# Patient Record
Sex: MALE | Race: WHITE | NOT HISPANIC OR LATINO | Employment: FULL TIME | URBAN - METROPOLITAN AREA
[De-identification: names, ages, dates, MRNs, and addresses within clinical notes are randomized per-mention and may not be internally consistent; named-entity substitution may affect disease eponyms.]

---

## 2022-06-02 ENCOUNTER — OFFICE VISIT (OUTPATIENT)
Dept: PLASTIC SURGERY | Facility: CLINIC | Age: 62
End: 2022-06-02

## 2022-06-02 ENCOUNTER — PREP FOR SURGERY (OUTPATIENT)
Dept: OTHER | Facility: HOSPITAL | Age: 62
End: 2022-06-02

## 2022-06-02 ENCOUNTER — TELEPHONE (OUTPATIENT)
Dept: PLASTIC SURGERY | Facility: CLINIC | Age: 62
End: 2022-06-02

## 2022-06-02 VITALS
SYSTOLIC BLOOD PRESSURE: 125 MMHG | BODY MASS INDEX: 32.86 KG/M2 | HEIGHT: 72 IN | OXYGEN SATURATION: 95 % | TEMPERATURE: 98.7 F | HEART RATE: 62 BPM | DIASTOLIC BLOOD PRESSURE: 84 MMHG | WEIGHT: 242.6 LBS

## 2022-06-02 DIAGNOSIS — L71.1 RHINOPHYMA: Primary | ICD-10-CM

## 2022-06-02 DIAGNOSIS — L71.9 ROSACEA: ICD-10-CM

## 2022-06-02 PROBLEM — C44.310 BASAL CELL CARCINOMA (BCC) OF SKIN OF FACE: Status: ACTIVE | Noted: 2022-06-02

## 2022-06-02 PROCEDURE — 99203 OFFICE O/P NEW LOW 30 MIN: CPT | Performed by: NURSE PRACTITIONER

## 2022-06-02 RX ORDER — METFORMIN HYDROCHLORIDE 500 MG/1
500 TABLET, EXTENDED RELEASE ORAL 2 TIMES DAILY
COMMUNITY
Start: 2022-03-07

## 2022-06-02 RX ORDER — LOSARTAN POTASSIUM 50 MG/1
50 TABLET ORAL DAILY
COMMUNITY
Start: 2022-03-07

## 2022-06-02 RX ORDER — AZELAIC ACID 0.15 G/G
GEL TOPICAL DAILY
COMMUNITY
Start: 2022-03-09

## 2022-06-02 RX ORDER — AMLODIPINE BESYLATE 10 MG/1
10 TABLET ORAL DAILY
COMMUNITY
Start: 2022-05-03

## 2022-06-02 RX ORDER — GLIPIZIDE 10 MG/1
10 TABLET ORAL
COMMUNITY

## 2022-06-02 RX ORDER — DOXYCYCLINE HYCLATE 100 MG/1
100 CAPSULE ORAL DAILY
COMMUNITY
Start: 2022-05-03 | End: 2023-03-10

## 2022-06-02 RX ORDER — CEFAZOLIN SODIUM 2 G/100ML
2 INJECTION, SOLUTION INTRAVENOUS ONCE
Status: CANCELLED | OUTPATIENT
Start: 2022-06-02 | End: 2022-06-02

## 2022-06-02 NOTE — PROGRESS NOTES
"Consult (RECON-Consult-Dr Perla-Rhinophyma)            History of Present Illness  Kelvin Dow is a 61 y.o. 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 He states he has tried several medications without success. He is currently using Azelez Gel and multiple medications. He will have Dr Perla's office fax notes with all medications he has tried and failed. The nose has enlarged so much it is disfigured and tender. The size of the nares are starting to occlude air flow through the nostril at times.    Subjective       Aspirin  Allergies Reconciled.    Review of Systems    All review of system has been reviewed and it  is negative except the ones note above.     Objective     /84 (BP Location: Left arm)   Pulse 62   Temp 98.7 °F (37.1 °C) (Temporal)   Ht 182.9 cm (72\")   Wt 110 kg (242 lb 9.6 oz)   SpO2 95%   BMI 32.90 kg/m²     Body mass index is 32.9 kg/m².    Physical Exam   Cardiovascular: Normal rate.     Pulmonary/Chest  Effort normal.      Face: nose with multiple raised firm areas with bulbous appearance over tip and extending laterally over both ala    Result Review :       Procedures Outpatient Surgery          Assessment and Plan      Diagnoses and all orders for this visit:    1. Rhinophyma (Primary)    2. Rosacea        Plan:  He will have Dr Perla's office fax office notes with all medications tried & failed. Discussed Rhinoplasty to correct nasal deformity. Will plan to have in OR. He does not take ASA, he does not smoke and is a good surgical candidate. Surgery will not improve his appearance but also prevent the ala from collapsing and obstructing air way.  Discussed risk and benefits  including bleeding, infection, reoccurrence, and scarring. Patient wishes to proceed. Will submit to insurance and schedule.    Time: 2 hours  Consent: " Rhinophyma Excision  CPT: 04706        Follow Up     No follow-ups on file.    Patient was given instructions and counseling regarding his condition. Please see specific information pulled into the AVS if appropriate.     FREDY Hernandez  06/02/2022

## 2022-06-02 NOTE — TELEPHONE ENCOUNTER
Patient states he is covid vaccinated 1st and 2nd doses of Moderna. Patient says his vaccination card is at home. I explained to patient he must bring the card the morning of surgery or they will cancel surgery.    We require patients to be covid tested prior to surgery, so if he can't find his card he will need to have this done 06/06/22.

## 2022-06-09 NOTE — PROGRESS NOTES
"Chief Complaint  Post-op Follow-up (1wk post op )    Subjective          History of Present Illness  Kelvin Dow is a 61 y.o. male who presents to [facility] for Postoperative Follow-Up of Rhinophyma Excision 6/10/22.   He is 1wk post op today and does not have any concerns.        Allergies: Aspirin  Allergies Reconciled.    Review of Systems   All review of system has been reviewed and it  is negative except the ones note above    Objective     /76 (BP Location: Left arm)   Pulse 55   Temp 98.1 °F (36.7 °C) (Temporal)   Ht 182.9 cm (72\")   Wt 107 kg (236 lb 9.6 oz)   SpO2 97%   BMI 32.09 kg/m²     Body mass index is 32.09 kg/m².    Physical Exam   Cardiovascular: Normal rate.     Pulmonary/Chest  Effort normal.        General Inspection: Incision healing well with dissolvable sutures and scab, minimal swelling, no erythema, no drainage    Result Review :                Assessment and Plan      Diagnoses and all orders for this visit:    1. Postoperative follow-up (Primary)        Plan:  • Recommended Laser in the future, stay out of sun x 1yr,  Apply Aquaphor to help with scarring, RTO 1M for follow up         Follow Up     Return for 1m for follow up .    Patient was given instructions and counseling regarding his condition. Please see specific information pulled into the AVS if appropriate.     Zahraa Goss, APRN  06/16/2022  "

## 2022-06-10 ENCOUNTER — HOSPITAL ENCOUNTER (OUTPATIENT)
Facility: HOSPITAL | Age: 62
Setting detail: HOSPITAL OUTPATIENT SURGERY
Discharge: HOME | End: 2022-06-10
Attending: SURGERY | Admitting: SURGERY
Payer: COMMERCIAL

## 2022-06-10 ENCOUNTER — ANESTHESIA EVENT (OUTPATIENT)
Dept: PERIOP | Facility: HOSPITAL | Age: 62
End: 2022-06-10
Payer: COMMERCIAL

## 2022-06-10 ENCOUNTER — ANESTHESIA (OUTPATIENT)
Dept: PERIOP | Facility: HOSPITAL | Age: 62
End: 2022-06-10
Payer: COMMERCIAL

## 2022-06-10 VITALS
HEIGHT: 72 IN | OXYGEN SATURATION: 94 % | RESPIRATION RATE: 18 BRPM | BODY MASS INDEX: 32.13 KG/M2 | TEMPERATURE: 97.8 F | HEART RATE: 58 BPM | SYSTOLIC BLOOD PRESSURE: 142 MMHG | DIASTOLIC BLOOD PRESSURE: 90 MMHG | WEIGHT: 237.22 LBS

## 2022-06-10 DIAGNOSIS — L71.1 RHINOPHYMA: ICD-10-CM

## 2022-06-10 LAB
GLUCOSE BLDC GLUCOMTR-MCNC: 135 MG/DL (ref 70–99)
GLUCOSE BLDC GLUCOMTR-MCNC: 146 MG/DL (ref 70–99)

## 2022-06-10 PROCEDURE — 25010000002 DEXAMETHASONE PER 1 MG

## 2022-06-10 PROCEDURE — 25010000002 MIDAZOLAM PER 1 MG: Performed by: ANESTHESIOLOGY

## 2022-06-10 PROCEDURE — 82962 GLUCOSE BLOOD TEST: CPT

## 2022-06-10 PROCEDURE — 30120 REVISION OF NOSE: CPT | Performed by: SURGERY

## 2022-06-10 PROCEDURE — 14060 TIS TRNFR E/N/E/L 10 SQ CM/<: CPT | Performed by: SURGERY

## 2022-06-10 PROCEDURE — 25010000002 CEFAZOLIN IN DEXTROSE 2-4 GM/100ML-% SOLUTION: Performed by: NURSE PRACTITIONER

## 2022-06-10 PROCEDURE — 25010000002 FENTANYL CITRATE (PF) 50 MCG/ML SOLUTION

## 2022-06-10 PROCEDURE — 25010000002 PROPOFOL 10 MG/ML EMULSION

## 2022-06-10 PROCEDURE — 25010000002 ONDANSETRON PER 1 MG

## 2022-06-10 PROCEDURE — 88305 TISSUE EXAM BY PATHOLOGIST: CPT | Performed by: SURGERY

## 2022-06-10 RX ORDER — ROCURONIUM BROMIDE 10 MG/ML
INJECTION, SOLUTION INTRAVENOUS AS NEEDED
Status: DISCONTINUED | OUTPATIENT
Start: 2022-06-10 | End: 2022-06-10 | Stop reason: SURG

## 2022-06-10 RX ORDER — CEFAZOLIN SODIUM 2 G/100ML
2 INJECTION, SOLUTION INTRAVENOUS ONCE
Status: COMPLETED | OUTPATIENT
Start: 2022-06-10 | End: 2022-06-10

## 2022-06-10 RX ORDER — OXYCODONE HYDROCHLORIDE 5 MG/1
5 TABLET ORAL
Status: DISCONTINUED | OUTPATIENT
Start: 2022-06-10 | End: 2022-06-10 | Stop reason: HOSPADM

## 2022-06-10 RX ORDER — FENTANYL CITRATE 50 UG/ML
INJECTION, SOLUTION INTRAMUSCULAR; INTRAVENOUS AS NEEDED
Status: DISCONTINUED | OUTPATIENT
Start: 2022-06-10 | End: 2022-06-10 | Stop reason: SURG

## 2022-06-10 RX ORDER — PHENYLEPHRINE HCL IN 0.9% NACL 1 MG/10 ML
SYRINGE (ML) INTRAVENOUS AS NEEDED
Status: DISCONTINUED | OUTPATIENT
Start: 2022-06-10 | End: 2022-06-10 | Stop reason: SURG

## 2022-06-10 RX ORDER — PROPOFOL 10 MG/ML
VIAL (ML) INTRAVENOUS AS NEEDED
Status: DISCONTINUED | OUTPATIENT
Start: 2022-06-10 | End: 2022-06-10 | Stop reason: SURG

## 2022-06-10 RX ORDER — SODIUM CHLORIDE, SODIUM LACTATE, POTASSIUM CHLORIDE, CALCIUM CHLORIDE 600; 310; 30; 20 MG/100ML; MG/100ML; MG/100ML; MG/100ML
9 INJECTION, SOLUTION INTRAVENOUS CONTINUOUS PRN
Status: DISCONTINUED | OUTPATIENT
Start: 2022-06-10 | End: 2022-06-10 | Stop reason: HOSPADM

## 2022-06-10 RX ORDER — ONDANSETRON 2 MG/ML
INJECTION INTRAMUSCULAR; INTRAVENOUS AS NEEDED
Status: DISCONTINUED | OUTPATIENT
Start: 2022-06-10 | End: 2022-06-10 | Stop reason: SURG

## 2022-06-10 RX ORDER — PROMETHAZINE HYDROCHLORIDE 25 MG/1
25 SUPPOSITORY RECTAL ONCE AS NEEDED
Status: DISCONTINUED | OUTPATIENT
Start: 2022-06-10 | End: 2022-06-10 | Stop reason: HOSPADM

## 2022-06-10 RX ORDER — LIDOCAINE HYDROCHLORIDE 20 MG/ML
INJECTION, SOLUTION EPIDURAL; INFILTRATION; INTRACAUDAL; PERINEURAL AS NEEDED
Status: DISCONTINUED | OUTPATIENT
Start: 2022-06-10 | End: 2022-06-10 | Stop reason: SURG

## 2022-06-10 RX ORDER — PROMETHAZINE HYDROCHLORIDE 12.5 MG/1
25 TABLET ORAL ONCE AS NEEDED
Status: DISCONTINUED | OUTPATIENT
Start: 2022-06-10 | End: 2022-06-10 | Stop reason: HOSPADM

## 2022-06-10 RX ORDER — MEPERIDINE HYDROCHLORIDE 25 MG/ML
12.5 INJECTION INTRAMUSCULAR; INTRAVENOUS; SUBCUTANEOUS
Status: DISCONTINUED | OUTPATIENT
Start: 2022-06-10 | End: 2022-06-10 | Stop reason: HOSPADM

## 2022-06-10 RX ORDER — MIDAZOLAM HYDROCHLORIDE 1 MG/ML
2 INJECTION INTRAMUSCULAR; INTRAVENOUS ONCE
Status: COMPLETED | OUTPATIENT
Start: 2022-06-10 | End: 2022-06-10

## 2022-06-10 RX ORDER — ONDANSETRON 2 MG/ML
4 INJECTION INTRAMUSCULAR; INTRAVENOUS ONCE AS NEEDED
Status: DISCONTINUED | OUTPATIENT
Start: 2022-06-10 | End: 2022-06-10 | Stop reason: HOSPADM

## 2022-06-10 RX ORDER — GLYCOPYRROLATE 0.2 MG/ML
INJECTION INTRAMUSCULAR; INTRAVENOUS AS NEEDED
Status: DISCONTINUED | OUTPATIENT
Start: 2022-06-10 | End: 2022-06-10 | Stop reason: SURG

## 2022-06-10 RX ORDER — DEXAMETHASONE SODIUM PHOSPHATE 4 MG/ML
INJECTION, SOLUTION INTRA-ARTICULAR; INTRALESIONAL; INTRAMUSCULAR; INTRAVENOUS; SOFT TISSUE AS NEEDED
Status: DISCONTINUED | OUTPATIENT
Start: 2022-06-10 | End: 2022-06-10 | Stop reason: SURG

## 2022-06-10 RX ORDER — ACETAMINOPHEN 500 MG
1000 TABLET ORAL ONCE
Status: COMPLETED | OUTPATIENT
Start: 2022-06-10 | End: 2022-06-10

## 2022-06-10 RX ADMIN — MIDAZOLAM HYDROCHLORIDE 2 MG: 1 INJECTION, SOLUTION INTRAMUSCULAR; INTRAVENOUS at 11:13

## 2022-06-10 RX ADMIN — ONDANSETRON 4 MG: 2 INJECTION INTRAMUSCULAR; INTRAVENOUS at 11:40

## 2022-06-10 RX ADMIN — SUGAMMADEX 200 MG: 100 INJECTION, SOLUTION INTRAVENOUS at 12:14

## 2022-06-10 RX ADMIN — PROPOFOL 150 MG: 10 INJECTION, EMULSION INTRAVENOUS at 11:31

## 2022-06-10 RX ADMIN — DEXAMETHASONE SODIUM PHOSPHATE 4 MG: 4 INJECTION, SOLUTION INTRA-ARTICULAR; INTRALESIONAL; INTRAMUSCULAR; INTRAVENOUS; SOFT TISSUE at 11:40

## 2022-06-10 RX ADMIN — ACETAMINOPHEN 1000 MG: 500 TABLET ORAL at 10:05

## 2022-06-10 RX ADMIN — FENTANYL CITRATE 50 MCG: 50 INJECTION, SOLUTION INTRAMUSCULAR; INTRAVENOUS at 11:31

## 2022-06-10 RX ADMIN — SODIUM CHLORIDE, POTASSIUM CHLORIDE, SODIUM LACTATE AND CALCIUM CHLORIDE 9 ML/HR: 600; 310; 30; 20 INJECTION, SOLUTION INTRAVENOUS at 10:05

## 2022-06-10 RX ADMIN — GLYCOPYRROLATE 0.2 MG: 0.2 INJECTION INTRAMUSCULAR; INTRAVENOUS at 12:00

## 2022-06-10 RX ADMIN — LIDOCAINE HYDROCHLORIDE 50 MG: 20 INJECTION, SOLUTION EPIDURAL; INFILTRATION; INTRACAUDAL; PERINEURAL at 11:31

## 2022-06-10 RX ADMIN — ROCURONIUM BROMIDE 50 MG: 10 INJECTION INTRAVENOUS at 11:31

## 2022-06-10 RX ADMIN — OXYCODONE HYDROCHLORIDE 5 MG: 5 TABLET ORAL at 12:31

## 2022-06-10 RX ADMIN — CEFAZOLIN SODIUM 2 G: 2 INJECTION, SOLUTION INTRAVENOUS at 11:32

## 2022-06-10 RX ADMIN — Medication 100 MCG: at 12:00

## 2022-06-10 NOTE — ANESTHESIA PREPROCEDURE EVALUATION
Anesthesia Evaluation     Patient summary reviewed and Nursing notes reviewed   no history of anesthetic complications:  NPO Solid Status: > 8 hours  NPO Liquid Status: > 2 hours           Airway   Mallampati: III  TM distance: >3 FB  Neck ROM: full  No difficulty expected  Comment: LARGE TONGUE   Dental      Pulmonary - negative pulmonary ROS and normal exam    breath sounds clear to auscultation  Cardiovascular - normal exam  Exercise tolerance: good (4-7 METS)    Rhythm: regular  Rate: normal    (+) hypertension,       Neuro/Psych- negative ROS  GI/Hepatic/Renal/Endo    (+)   diabetes mellitus type 2,     Musculoskeletal (-) negative ROS    Abdominal    Substance History - negative use     OB/GYN negative ob/gyn ROS         Other - negative ROS                     Anesthesia Plan    ASA 3     general     (Patient understands anesthesia not responsible for dental damage.)  intravenous induction     Anesthetic plan, risks, benefits, and alternatives have been provided, discussed and informed consent has been obtained with: patient.    Plan discussed with CRNA.        CODE STATUS:

## 2022-06-10 NOTE — ANESTHESIA POSTPROCEDURE EVALUATION
Patient: Kelvin Dow    Procedure Summary     Date: 06/10/22 Room / Location: [facility] OR  / [facility]    Anesthesia Start: 1122 Anesthesia Stop: 1225    Procedure: Bilateral Nasal Rhinophyma Excision (Bilateral ) Diagnosis:       Rhinophyma      (Rhinophyma [L71.1])    Surgeons: Cierra Pinzon MD Provider: Laurent Montgomery MD    Anesthesia Type: general ASA Status: 3          Anesthesia Type: general    Vitals  Vitals Value Taken Time   /78 06/10/22 1240   Temp 36 °C (96.8 °F) 06/10/22 1224   Pulse 63 06/10/22 1250   Resp 14 06/10/22 1240   SpO2 92 % 06/10/22 1250   Vitals shown include unvalidated device data.        Post Anesthesia Care and Evaluation    Patient location during evaluation: bedside  Patient participation: complete - patient participated  Level of consciousness: awake and alert  Pain management: adequate    Airway patency: patent  Anesthetic complications: No anesthetic complications  PONV Status: none  Cardiovascular status: acceptable  Respiratory status: acceptable  Hydration status: acceptable    Comments: An Anesthesiologist personally participated in the most demanding procedures (including induction and emergence if applicable) in the anesthesia plan, monitored the course of anesthesia administration at frequent intervals and remained physically present and available for immediate diagnosis and treatment of emergencies.

## 2022-06-14 LAB
CYTO UR: NORMAL
LAB AP CASE REPORT: NORMAL
LAB AP CLINICAL INFORMATION: NORMAL
PATH REPORT.FINAL DX SPEC: NORMAL
PATH REPORT.GROSS SPEC: NORMAL

## 2022-06-16 ENCOUNTER — OFFICE VISIT (OUTPATIENT)
Dept: PLASTIC SURGERY | Facility: CLINIC | Age: 62
End: 2022-06-16

## 2022-06-16 VITALS
OXYGEN SATURATION: 97 % | DIASTOLIC BLOOD PRESSURE: 76 MMHG | TEMPERATURE: 98.1 F | SYSTOLIC BLOOD PRESSURE: 131 MMHG | HEART RATE: 55 BPM | BODY MASS INDEX: 32.05 KG/M2 | HEIGHT: 72 IN | WEIGHT: 236.6 LBS

## 2022-06-16 DIAGNOSIS — Z09 POSTOPERATIVE FOLLOW-UP: Primary | ICD-10-CM

## 2022-06-16 PROCEDURE — 99024 POSTOP FOLLOW-UP VISIT: CPT | Performed by: NURSE PRACTITIONER

## 2022-06-16 RX ORDER — CANAGLIFLOZIN 100 MG/1
TABLET, FILM COATED ORAL
COMMUNITY
Start: 2022-06-06

## 2022-07-08 NOTE — PROGRESS NOTES
"Chief Complaint  Post-op Follow-up (Bilateral Nasal Rhinophyma Excision)    Subjective          History of Present Illness  Kelvin Dow is a 61 y.o. male who presents to [facility] for Postoperative Follow-Up of Rhinophyma Excision 6/10/22.     He is 1m post op.  Doing well, no concerns.      Allergies: Aspirin  Allergies Reconciled.    Review of Systems   All review of system has been reviewed and it  is negative except the ones note above    Objective     /86 (BP Location: Left arm, Patient Position: Sitting)   Pulse 63   Temp 98.7 °F (37.1 °C) (Temporal)   Ht 182.9 cm (72.01\")   Wt 107 kg (236 lb 14.4 oz)   SpO2 97%   BMI 32.12 kg/m²     Body mass index is 32.12 kg/m².    Physical Exam   Cardiovascular: Normal rate.     Pulmonary/Chest  Effort normal.        Nose: Incision healing well,  no erythema, no drainage, no open areas     Result Review :                Assessment and Plan      Diagnoses and all orders for this visit:    1. Rhinophyma (Primary)        Plan:  Can resume Azeliac gel, avoid direct sun, RTC 3 months and will discuss further surgical procedures vs laser  Follow Up     Return in about 3 months (around 10/14/2022) for f/u rhinophyma excision with Dr. Pinzon.    Patient was given instructions and counseling regarding his condition. Please see specific information pulled into the AVS if appropriate.     Zahraa Goss, APRN  07/14/2022  "

## 2022-07-14 ENCOUNTER — OFFICE VISIT (OUTPATIENT)
Dept: PLASTIC SURGERY | Facility: CLINIC | Age: 62
End: 2022-07-14

## 2022-07-14 VITALS
BODY MASS INDEX: 32.09 KG/M2 | HEART RATE: 63 BPM | WEIGHT: 236.9 LBS | HEIGHT: 72 IN | OXYGEN SATURATION: 97 % | SYSTOLIC BLOOD PRESSURE: 145 MMHG | TEMPERATURE: 98.7 F | DIASTOLIC BLOOD PRESSURE: 86 MMHG

## 2022-07-14 DIAGNOSIS — L71.1 RHINOPHYMA: Primary | ICD-10-CM

## 2022-07-14 PROCEDURE — 99024 POSTOP FOLLOW-UP VISIT: CPT | Performed by: NURSE PRACTITIONER

## 2022-10-11 NOTE — PROGRESS NOTES
"Chief Complaint  Post-op Follow-up (4 months post op )    Subjective          History of Present Illness  Kelvin Dow is a 61 y.o. male who presents to [facility] for Postoperative Follow-Up of Rhinophyma Excision 6/10/22.     He is here for 4m post op. Has 2 areas that her sticking out and would like to have that removed, other wise happy with result, size and contour improved.      Allergies: Aspirin  Allergies Reconciled.    Review of Systems   All review of system has been reviewed and it  is negative except the ones note above    Objective     /88   Pulse 68   Temp 96.8 °F (36 °C)   Ht 182.9 cm (72.01\")   Wt 107 kg (236 lb)   SpO2 94%   BMI 32.00 kg/m²     Body mass index is 32 kg/m².    Physical Exam   Cardiovascular: Normal rate.     Pulmonary/Chest  Effort normal.        Nose  There are two 1 cm area on each side of the nares with flaps of skin hanging down, other areas healed with no open areas, rosacea persistent   Result Review :           PROCEDURE:  RECON- In office excision of rhinophyma and resurfacing laser 1.5hr.     Assessment and Plan      Diagnoses and all orders for this visit:    1. Rhinophyma (Primary)        Plan:   Discussed risk and benefits of excision of lesion including bleeding, infection, and scarring. Patient wishes to proceed. Will submit to insurance and schedule for in-office excision. We can perform laser the same day for rosacea to prevent further formation of rhynophyma. RTC for procedure.     CPT 78626,  72401         Scribed by Vivienne Mcclain MA, acting as a scribe for FREDY Hernandez, 10/20/22 09:42 EDT.  FREDY Hernandez's signature on the note affirms that the note adequately documents the care provided.    Patient was given instructions and counseling regarding his condition. Please see specific information pulled into the AVS if appropriate.     FREDY Hernandez  10/20/2022  "

## 2022-10-20 ENCOUNTER — OFFICE VISIT (OUTPATIENT)
Dept: PLASTIC SURGERY | Facility: CLINIC | Age: 62
End: 2022-10-20

## 2022-10-20 VITALS
WEIGHT: 236 LBS | DIASTOLIC BLOOD PRESSURE: 88 MMHG | SYSTOLIC BLOOD PRESSURE: 151 MMHG | BODY MASS INDEX: 31.97 KG/M2 | HEART RATE: 68 BPM | OXYGEN SATURATION: 94 % | TEMPERATURE: 96.8 F | HEIGHT: 72 IN

## 2022-10-20 DIAGNOSIS — L71.1 RHINOPHYMA: Primary | ICD-10-CM

## 2022-10-20 PROCEDURE — 99212 OFFICE O/P EST SF 10 MIN: CPT | Performed by: NURSE PRACTITIONER

## 2023-01-16 NOTE — PROGRESS NOTES
"Chief Complaint  Procedure (IOP Excision of rhinophyma and MaxG laser treatment)    Subjective              History of Present Illness  Kelvin Dow is a 62 y.o. male who presents to [facility] as a in office procedure  for excision of rhinophyma and Max G laser treatment    Allergies: Aspirin  Allergies Reconciled.    Review of Systems   All review of system has been reviewed and it  is negative except the ones note above.     Objective     /81 (BP Location: Left arm, Patient Position: Sitting)   Pulse 67   Temp 98 °F (36.7 °C) (Temporal)   Ht 182.9 cm (72.01\")   Wt 103 kg (227 lb 9.6 oz)   SpO2 95%   BMI 30.86 kg/m²     Body mass index is 30.86 kg/m².    Physical Exam    Cardiovascular: Normal rate    Pulmonary/Chest: Effort normal    Face: Rhinophyma on right side of nose, vascular lesions bilaterally.     Result Review :             Excision Procedure: Consent obtained. Local injected to site, Lidocaine 1% with epi.  Site prepped with ChloraPrep  in sterile fashion. Site draped in sterile fashion. I dissected down through skin and subcutaneous tissue completely around  Rhinophema  , after excision of  was sent from field for pathology. Established hemostasis with direct pressure. Site was thoroughly irrigated.  Bacitracin applied. The patient tolerated the procedure well with no immediate complications. Used handheld Bovie for cauterization.     Patient is here today for IPL/Laser treatment.  Treatment questionnaire completed and patient approved for procedure. All indications, benefits, alternatives, expected outcomes, and complications of laser treatment to the nose was discussed with the patient including:  · (Common side effects) unsatisfactory results, bleeding, bruising, swelling, scarring, pigment changes, hair loss or decreased growth, herpetic breakouts  and  · (Rare but serious complications) blood clots, blisters, burns, skin loss, " hematomas, allergic reactions.     Patient verbalizes understanding that sun exposure, tanning lamps and self-tanning creams are to be avoided before and after treatment or complications could occur.     Informed consent was obtained. Patient understands, accept risks, consents and wishes to proceed. This skin typing was then confirmed using the Skintel Reading device by obtaining five sample readings after application of Lux Lotion, showing the patient Melanin Index of 18. Visible skin in the treatment area removed using manual razor. Spot testing performed per protocol, (Crespo 1-3 wait 5-15 mins, Crespo 4-6 spot test performed) patient tolerated well.     In the exam room,  prepped with alcohol avoiding the hair and was allowed to sit at least 3 minutes. Laser-safe eye protection with appropriate wavelength and optical density was worn by all healthcare workers and the patient.  Procedural time out completed. Lux Lotion applied to treatment area as indicated. Laser performed. Pre-cooling to the treatment area performed, as indicated during procedure. Appropriate response noted throughout treatment. Followed by a post cooling phase.  The patient tolerated the procedure well with no immediate complications. Post-procedure instructions were given.     Procedural settings:  Area: Nose   Max G   MS - 20  J/cm2 - 38  Total Pulses - 24      Procedural settings:  Area: Nose   Max G  MS - 20  J/cm2 - 60  Total Pulses - 21      Procedural settings:  Area: Nose  2940  MS - 9/0/0  Total Pulses - 255    Treated size area: 5x 5 cm       Scribed by Carla De Souza, acting as a scribe for Cierra Pinzon MD, 01/26/23 10:09 EST.  Cierra Pinzon MD's signature on the note affirms that the note adequately documents the care provided.              Patient tolerated procedure well. No adverse effects noted. Post treatment instructions provider to patient. Patient also instructed to contact office with any questions  or concerns.           Assessment and Plan      Diagnoses and all orders for this visit:    1. Rhinophyma (Primary)        Plan:  RTO in 4-6 weeks for next Laser Treatment to Nose Max G & 2940 40min total.         Follow Up     No follow-ups on file.    Patient was given instructions and counseling regarding his condition. Please see specific information pulled into the AVS if appropriate.     Cierra Pinzon MD  01/26/2023

## 2023-01-26 ENCOUNTER — PROCEDURE VISIT (OUTPATIENT)
Dept: PLASTIC SURGERY | Facility: CLINIC | Age: 63
End: 2023-01-26
Payer: COMMERCIAL

## 2023-01-26 VITALS
DIASTOLIC BLOOD PRESSURE: 81 MMHG | WEIGHT: 227.6 LBS | SYSTOLIC BLOOD PRESSURE: 127 MMHG | BODY MASS INDEX: 30.83 KG/M2 | HEART RATE: 67 BPM | TEMPERATURE: 98 F | OXYGEN SATURATION: 95 % | HEIGHT: 72 IN

## 2023-01-26 DIAGNOSIS — L71.1 RHINOPHYMA: Primary | ICD-10-CM

## 2023-01-26 PROCEDURE — 30120 REVISION OF NOSE: CPT | Performed by: SURGERY

## 2023-01-26 PROCEDURE — 0479T FXJL ABL LSR 1ST 100 SQ CM: CPT | Performed by: SURGERY

## 2023-01-26 RX ORDER — INSULIN GLARGINE 300 U/ML
INJECTION, SOLUTION SUBCUTANEOUS
COMMUNITY
Start: 2022-12-29

## 2023-03-10 ENCOUNTER — PROCEDURE VISIT (OUTPATIENT)
Dept: PLASTIC SURGERY | Facility: CLINIC | Age: 63
End: 2023-03-10
Payer: COMMERCIAL

## 2023-03-10 VITALS
BODY MASS INDEX: 30.64 KG/M2 | DIASTOLIC BLOOD PRESSURE: 81 MMHG | WEIGHT: 226.2 LBS | HEART RATE: 68 BPM | OXYGEN SATURATION: 96 % | SYSTOLIC BLOOD PRESSURE: 127 MMHG | TEMPERATURE: 98.7 F | HEIGHT: 72 IN

## 2023-03-10 DIAGNOSIS — L71.1 RHINOPHYMA: Primary | ICD-10-CM

## 2023-03-10 PROCEDURE — 0479T FXJL ABL LSR 1ST 100 SQ CM: CPT | Performed by: NURSE PRACTITIONER

## 2023-03-10 RX ORDER — DOXYCYCLINE HYCLATE 50 MG/1
CAPSULE ORAL
COMMUNITY
Start: 2023-03-07

## 2023-04-11 NOTE — PROGRESS NOTES
"Chief Complaint  Laser Treatment (3 for me)    Subjective          History of Present Illness  Kelvin Dow is a 62 y.o. male who presents to [facility] as a cosmetic in office procedure for Max G/2940 laser treatment on nose.    Allergies: Aspirin  Allergies Reconciled.    Review of Systems   All review of system has been reviewed and it  is negative except the ones note above.     Objective     /82 (BP Location: Left arm, Patient Position: Sitting)   Pulse 60   Temp 98.4 °F (36.9 °C) (Temporal)   Ht 182.9 cm (72.01\")   Wt 102 kg (224 lb 12.8 oz)   SpO2 93%   BMI 30.48 kg/m²     Body mass index is 30.48 kg/m².    Physical Exam    Cardiovascular: Normal rate    Pulmonary/Chest: Effort normal    Face: Rhinophyma of nose, vascular lesions bilaterally.     Result Review :         Patient is here today for IPL/Laser treatment.  Treatment questionnaire completed and patient approved for procedure. All indications, benefits, alternatives, expected outcomes, and complications of laser treatment to the nose was discussed with the patient including:  · (Common side effects) unsatisfactory results, bleeding, bruising, swelling, scarring, pigment changes, hair loss or decreased growth, herpetic breakouts  and  · (Rare but serious complications) blood clots, blisters, burns, skin loss, hematomas, allergic reactions.     Patient verbalizes understanding that sun exposure, tanning lamps and self-tanning creams are to be avoided before and after treatment or complications could occur.     Informed consent was obtained. Patient understands, accept risks, consents and wishes to proceed. This skin typing was then confirmed using the Skintel Reading device by obtaining five sample readings after application of Lux Lotion, showing the patient Melanin Index of 18. Visible skin in the treatment area removed using manual razor. Spot testing performed per protocol, (Crespo " 1-3 wait 5-15 mins, Crespo 4-6 spot test performed) patient tolerated well.     In the exam room,  prepped with alcohol avoiding the hair and was allowed to sit at least 3 minutes. Laser-safe eye protection with appropriate wavelength and optical density was worn by all healthcare workers and the patient.  Procedural time out completed. Lux Lotion applied to treatment area as indicated. Laser performed. Pre-cooling to the treatment area performed, as indicated during procedure. Appropriate response noted throughout treatment. Followed by a post cooling phase.  The patient tolerated the procedure well with no immediate complications. Post-procedure instructions were given.     Procedural settings:  Area: Nose  Max G  MS - 10/30  Total Pulses - 29 pulses    Treated size area: 5x 5 cm     Area: Nose  1540  MS-15/50  Total pulses: 32    Assessment and Plan      Diagnoses and all orders for this visit:    1. Rhinophyma (Primary)        Plan:  Patient tolerated procedure well. No adverse effects noted. Post treatment instructions provider to patient. Patient also instructed to contact office with any questions or concerns.  May apply hydrating cream to nose. We done 1540 today with no charge due to 2940 not working. We will call patient once we get the technical difficulties settled.    Scribed by Vivienne Mcclain MA, acting as a scribe for FREDY Hernandez, 04/20/23 10:06 EDT.  FREDY Hernandez's signature on the note affirms that the note adequately documents the care provided.      This is a cosmetic visit. No charge due to laser stopped working and we will reschedule.    Patient was given instructions and counseling regarding his condition. Please see specific information pulled into the AVS if appropriate.     Vivienne Mcclain MA  04/20/2023

## 2023-04-20 ENCOUNTER — PROCEDURE VISIT (OUTPATIENT)
Dept: PLASTIC SURGERY | Facility: CLINIC | Age: 63
End: 2023-04-20

## 2023-04-20 VITALS
DIASTOLIC BLOOD PRESSURE: 82 MMHG | HEIGHT: 72 IN | TEMPERATURE: 98.4 F | OXYGEN SATURATION: 93 % | BODY MASS INDEX: 30.45 KG/M2 | SYSTOLIC BLOOD PRESSURE: 131 MMHG | HEART RATE: 60 BPM | WEIGHT: 224.8 LBS

## 2023-04-20 DIAGNOSIS — L71.1 RHINOPHYMA: Primary | ICD-10-CM

## 2023-06-06 NOTE — PROGRESS NOTES
"Chief Complaint  Laser Treatment    Subjective          History of Present Illness  Kelvin Dow is a 62 y.o. male who presents to [facility] as a cosmetic in office procedure for Max G/2940 laser treatment on nose.    Allergies: Aspirin  Allergies Reconciled.    Review of Systems   All review of system has been reviewed and it  is negative except the ones note above.     Objective     /84 (BP Location: Left arm, Patient Position: Sitting, Cuff Size: Large Adult)   Pulse 82   Temp 98 °F (36.7 °C) (Temporal)   Ht 182.9 cm (72.01\")   Wt 103 kg (227 lb)   SpO2 99%   BMI 30.78 kg/m²     Body mass index is 30.78 kg/m².    Physical Exam   Cardiovascular: Normal rate.     Pulmonary/Chest  Effort normal.     Cardiovascular: Normal rate    Pulmonary/Chest: Effort normal    Face: Rhinophyma of nose, vascular lesions bilaterally.     Result Review :         Patient is here today for IPL/Laser treatment.  Treatment questionnaire completed and patient approved for procedure. All indications, benefits, alternatives, expected outcomes, and complications of laser treatment to the nose was discussed with the patient including:  · (Common side effects) unsatisfactory results, bleeding, bruising, swelling, scarring, pigment changes, hair loss or decreased growth, herpetic breakouts  and  · (Rare but serious complications) blood clots, blisters, burns, skin loss, hematomas, allergic reactions.     Patient verbalizes understanding that sun exposure, tanning lamps and self-tanning creams are to be avoided before and after treatment or complications could occur.     Informed consent was obtained. Patient understands, accept risks, consents and wishes to proceed. This skin typing was then confirmed using the Skintel Reading device by obtaining five sample readings after application of Lux Lotion, showing the patient Melanin Index of 18. Visible skin in the treatment area " removed using manual razor. Spot testing performed per protocol, (Crespo 1-3 wait 5-15 mins, Crespo 4-6 spot test performed) patient tolerated well.     In the exam room,  prepped with alcohol avoiding the hair and was allowed to sit at least 3 minutes. Laser-safe eye protection with appropriate wavelength and optical density was worn by all healthcare workers and the patient.  Procedural time out completed. Lux Lotion applied to treatment area as indicated. Laser performed. Pre-cooling to the treatment area performed, as indicated during procedure. Appropriate response noted throughout treatment. Followed by a post cooling phase.  The patient tolerated the procedure well with no immediate complications.      Procedural settings:  Area: Nose  Max G  MS - 10/38  Total Pulses - 30 pulses    Treated size area: 5x 5 cm     Area: Nose  2940  MS-9/0/0  Total pulses: 408    Assessment and Plan      Diagnoses and all orders for this visit:    1. Rhinophyma (Primary)      Plan:  Patient tolerated procedure well. No adverse effects noted. Post treatment instructions provider to patient. Patient also instructed to contact office with any questions or concerns.  May apply hydrating cream to nose. Patient is aware to keep area covered when working outside. RTC 4 weeks for next laser treatment.    Scribed by Vivienne Mcclain MA, acting as a scribe for FREDY Hernandez, 06/15/23 13:12 EDT.  FREDY Hernandez's signature on the note affirms that the note adequately documents the care provided.      Patient was given instructions and counseling regarding his condition. Please see specific information pulled into the AVS if appropriate.     FREDY Hernandez  06/15/2023

## 2023-06-07 ENCOUNTER — TELEPHONE (OUTPATIENT)
Dept: PLASTIC SURGERY | Facility: CLINIC | Age: 63
End: 2023-06-07
Payer: COMMERCIAL

## 2023-06-07 NOTE — TELEPHONE ENCOUNTER
Dr. Jones from [facility] called about Mr. Dow's denial.  Wants to speak to Zahraa Goss.  Case number [id number] and they stated they will call tomorrow when Zahraa is back in the office.

## 2023-06-15 ENCOUNTER — PROCEDURE VISIT (OUTPATIENT)
Dept: PLASTIC SURGERY | Facility: CLINIC | Age: 63
End: 2023-06-15

## 2023-06-15 VITALS
HEIGHT: 72 IN | DIASTOLIC BLOOD PRESSURE: 84 MMHG | SYSTOLIC BLOOD PRESSURE: 151 MMHG | WEIGHT: 227 LBS | TEMPERATURE: 98 F | BODY MASS INDEX: 30.75 KG/M2 | HEART RATE: 82 BPM | OXYGEN SATURATION: 99 %

## 2023-06-15 DIAGNOSIS — L71.1 RHINOPHYMA: Primary | ICD-10-CM

## 2025-07-21 ENCOUNTER — OFFICE VISIT (OUTPATIENT)
Dept: UROLOGY | Facility: CLINIC | Age: 65
End: 2025-07-21
Payer: MEDICARE

## 2025-07-21 ENCOUNTER — LAB (OUTPATIENT)
Dept: LAB | Facility: HOSPITAL | Age: 65
End: 2025-07-21
Payer: MEDICARE

## 2025-07-21 VITALS — WEIGHT: 225 LBS | BODY MASS INDEX: 30.48 KG/M2 | HEIGHT: 72 IN | RESPIRATION RATE: 16 BRPM

## 2025-07-21 DIAGNOSIS — N40.1 BENIGN PROSTATIC HYPERPLASIA WITH URINARY FREQUENCY: ICD-10-CM

## 2025-07-21 DIAGNOSIS — R97.20 ELEVATED PROSTATE SPECIFIC ANTIGEN (PSA): Primary | ICD-10-CM

## 2025-07-21 DIAGNOSIS — R35.0 BENIGN PROSTATIC HYPERPLASIA WITH URINARY FREQUENCY: ICD-10-CM

## 2025-07-21 DIAGNOSIS — R31.29 MICROHEMATURIA: ICD-10-CM

## 2025-07-21 DIAGNOSIS — R97.20 ELEVATED PROSTATE SPECIFIC ANTIGEN (PSA): ICD-10-CM

## 2025-07-21 PROBLEM — I10 HYPERTENSION: Status: ACTIVE | Noted: 2022-01-22

## 2025-07-21 PROBLEM — M19.90 ARTHRITIS: Status: ACTIVE | Noted: 2021-08-03

## 2025-07-21 PROBLEM — E11.9 TYPE 2 DIABETES MELLITUS: Chronic | Status: ACTIVE | Noted: 2022-02-19

## 2025-07-21 PROBLEM — J30.2 SEASONAL ALLERGIES: Status: ACTIVE | Noted: 2020-03-07

## 2025-07-21 PROBLEM — J30.9 ALLERGIC RHINITIS: Status: ACTIVE | Noted: 2021-08-03

## 2025-07-21 PROBLEM — E55.9 VITAMIN D DEFICIENCY: Status: ACTIVE | Noted: 2020-12-11

## 2025-07-21 PROBLEM — E78.5 HYPERLIPIDEMIA: Status: ACTIVE | Noted: 2022-01-22

## 2025-07-21 PROBLEM — C44.310 BASAL CELL CARCINOMA (BCC) OF SKIN OF FACE: Status: RESOLVED | Noted: 2022-06-02 | Resolved: 2025-07-21

## 2025-07-21 LAB
BACTERIA UR QL AUTO: NORMAL /HPF
BILIRUB BLD-MCNC: NEGATIVE MG/DL
CLARITY, POC: CLEAR
COLOR UR: YELLOW
GLUCOSE UR STRIP-MCNC: ABNORMAL MG/DL
HYALINE CASTS UR QL AUTO: NORMAL /LPF
KETONES UR QL: NEGATIVE
LEUKOCYTE EST, POC: NEGATIVE
NITRITE UR-MCNC: NEGATIVE MG/ML
PH UR: 5 [PH] (ref 5–8)
PROT UR STRIP-MCNC: NEGATIVE MG/DL
PSA SERPL-MCNC: 5.01 NG/ML (ref 0–4)
RBC # UR STRIP: ABNORMAL /UL
RBC # UR STRIP: NORMAL /HPF
REF LAB TEST METHOD: NORMAL
SP GR UR: 1.02 (ref 1–1.03)
SQUAMOUS #/AREA URNS HPF: NORMAL /HPF
UROBILINOGEN UR QL: ABNORMAL
WBC # UR STRIP: NORMAL /HPF

## 2025-07-21 PROCEDURE — 99214 OFFICE O/P EST MOD 30 MIN: CPT | Performed by: NURSE PRACTITIONER

## 2025-07-21 PROCEDURE — 36415 COLL VENOUS BLD VENIPUNCTURE: CPT

## 2025-07-21 PROCEDURE — 84153 ASSAY OF PSA TOTAL: CPT

## 2025-07-21 PROCEDURE — 81015 MICROSCOPIC EXAM OF URINE: CPT | Performed by: NURSE PRACTITIONER

## 2025-07-21 PROCEDURE — 1159F MED LIST DOCD IN RCRD: CPT | Performed by: NURSE PRACTITIONER

## 2025-07-21 PROCEDURE — 1160F RVW MEDS BY RX/DR IN RCRD: CPT | Performed by: NURSE PRACTITIONER

## 2025-07-21 PROCEDURE — 81002 URINALYSIS NONAUTO W/O SCOPE: CPT | Performed by: NURSE PRACTITIONER

## 2025-07-21 RX ORDER — SENNOSIDES 8.6 MG
650 CAPSULE ORAL
COMMUNITY

## 2025-07-21 RX ORDER — ATORVASTATIN CALCIUM 10 MG/1
10 TABLET, FILM COATED ORAL DAILY
COMMUNITY
Start: 2025-07-08

## 2025-07-21 NOTE — PROGRESS NOTES
Chief Complaint: Elevated PSA and Establish Care    Subjective         History of Present Illness  Kelvin Dow is a 64 y.o. male presents to [facility] to be seen for elevated PSA.    Patients PCP ordered labs to evaluate his PSA for annual wellness visit in 4/2025.     PSA was noted to be 4.4 with % free of 15.0 (35%)    Nocturia x 2-4    Stream is slow.    He denies straining/ hesitancy.     Some urgency/ frequency.     He denies perineal/ post ejaculate pain.     He has ha hx of microhematuria has had a cysto in the past about 209 years ago Dr. Harmon.     No family hx of  malignancies. Heavy family hx of pancreatic cancer.           Objective     Past Medical History:   Diagnosis Date    basal cell carcinoma shoulder 2019    Primary hypertension     Type 2 diabetes mellitus     Varicose veins of left lower extremity 1996       Past Surgical History:   Procedure Laterality Date    BACK SURGERY  1988    COLONOSCOPY  2022    EXCISION LESION Bilateral 6/10/2022    Procedure: Bilateral Nasal Rhinophyma Excision;  Surgeon: Cierra Pinzon MD;  [place] [facility] [place] [facility] [place]  Service: [place];  Laterality: Bilateral;    PELVIC FRACTURE SURGERY  1982    REPLACEMENT TOTAL KNEE Left 2016         Current Outpatient Medications:     Accu-Chek Guide test strip, , Disp: , Rfl:     acetaminophen (TYLENOL) 650 MG 8 hr tablet, Take 1 tablet by mouth. (Patient taking differently: Take 1 tablet by mouth As Needed for Mild Pain.), Disp: , Rfl:     amLODIPine (NORVASC) 10 MG tablet, Take 1 tablet by mouth Daily., Disp: , Rfl:     atorvastatin (LIPITOR) 10 MG tablet, Take 1 tablet by mouth Daily., Disp: , Rfl:     azelaic acid (AZELEX) 15 % gel, Daily., Disp: , Rfl:     Blood Glucose Monitoring Suppl (Accu-Chek Guide Me) w/Device kit, , Disp: , Rfl:     doxycycline (VIBRAMYCIN) 50 MG capsule, , Disp: , Rfl:     Droplet Pen Needles 32G X 4 MM misc, , Disp: , Rfl:     glipizide (GLUCOTROL) 10 MG tablet,  "Take 1 tablet by mouth 2 (Two) Times a Day Before Meals., Disp: , Rfl:     Insulin Glargine, 2 Unit Dial, (Toujeo Max SoloStar) 300 UNIT/ML solution pen-injector injection, INJECT 18 UNITS UNDER THE SKIN DAILY, Disp: , Rfl:     Lancets (onetouch ultrasoft) lancets, , Disp: , Rfl:     losartan (COZAAR) 50 MG tablet, Take 1 tablet by mouth Daily., Disp: , Rfl:     metFORMIN ER (GLUCOPHAGE-XR) 500 MG 24 hr tablet, Take 1 tablet by mouth 2 (Two) Times a Day., Disp: , Rfl:     glimepiride (AMARYL) 1 MG tablet, Take 1 tablet by mouth., Disp: , Rfl:     Allergies   Allergen Reactions    Aspirin Swelling        Family History   Problem Relation Age of Onset    Hypertension Father     Pancreatic cancer Father     Diabetes Paternal Grandmother     Pancreatic cancer Paternal Grandmother        Social History     Socioeconomic History    Marital status:    Tobacco Use    Smoking status: Never     Passive exposure: Never    Smokeless tobacco: Never   Vaping Use    Vaping status: Never Used   Substance and Sexual Activity    Alcohol use: Yes     Comment: rarely    Drug use: Never    Sexual activity: Defer       Vital Signs:   Resp 16   Ht 182.9 cm (72\")   Wt 102 kg (225 lb)   BMI 30.52 kg/m²      Physical Exam     Result Review :   The following data was reviewed by: FREDY Bailey on 07/21/2025:  Results for orders placed or performed in visit on 07/21/25   POC Urinalysis Dipstick    Collection Time: 07/21/25 10:21 AM    Specimen: Urine   Result Value Ref Range    Color Yellow Yellow, Straw, Dark Yellow, Marlene    Clarity, UA Clear Clear    Glucose, UA >=1000 mg/dL (3+) (A) Negative mg/dL    Bilirubin Negative Negative    Ketones, UA Negative Negative    Specific Gravity  1.025 1.005 - 1.030    Blood, UA Small (A) Negative    pH, Urine 5.0 5.0 - 8.0    Protein, POC Negative Negative mg/dL    Urobilinogen, UA 0.2 E.U./dL Normal, 0.2 E.U./dL    Leukocytes Negative Negative    Nitrite, UA Negative Negative    "         Procedures        Assessment and Plan    Diagnoses and all orders for this visit:    1. Elevated prostate specific antigen (PSA) (Primary)  -     POC Urinalysis Dipstick  -     Urinalysis, Microscopic Only - Urine, Clean Catch; Future  -     PSA Diagnostic; Future  -     Urinalysis, Microscopic Only - Urine, Clean Catch  -     MRI Prostate With & Without Contrast; Future  -     ExoDx Prostate Test -; Future    2. Benign prostatic hyperplasia with urinary frequency    3. Microhematuria        Will get prior PSAs from PCP office.    Given elevated PSA we will proceed with MRI of the prostate to delineate underlying etiology of elevated PSA and provide mapping for fusion biopsy.  Next  Will order exosome score to delineate his risk for high-grade disease.    Send UA micro to evaluate for presence of microscopic hematuria as it has been several years since he has been evaluated for this he may require upper and lower urinary tract evaluation with a CT of the abdomen and pelvis with and without contrast or delayed imaging as well as a cystoscopy if this returns positive.    Did offer medicines for lower urinary tract symptoms however patient would like to explore elevated PSA prior to proceeding with interventions for BPH.      I spent 15 minutes caring for Kelvin on this date of service. This time includes time spent by me in the following activities:reviewing tests, obtaining and/or reviewing a separately obtained history, performing a medically appropriate examination and/or evaluation , counseling and educating the patient/family/caregiver, ordering medications, tests, or procedures, and documenting information in the medical record  Follow Up   No follow-ups on file.  Patient was given instructions and counseling regarding his condition or for health maintenance advice. Please see specific information pulled into the AVS if appropriate.         This document has been electronically signed by Meg Reyes  APRN  July 21, 2025 11:11 EDT

## 2025-08-29 ENCOUNTER — HOSPITAL ENCOUNTER (OUTPATIENT)
Dept: MRI IMAGING | Facility: HOSPITAL | Age: 65
Discharge: HOME | End: 2025-08-29
Payer: MEDICARE

## 2025-08-29 DIAGNOSIS — R97.20 ELEVATED PROSTATE SPECIFIC ANTIGEN (PSA): ICD-10-CM

## 2025-08-29 LAB
CREAT BLDA-MCNC: 0.9 MG/DL (ref 0.6–1.3)
EGFRCR SERPLBLD CKD-EPI 2021: 94.8 ML/MIN/1.73

## 2025-08-29 PROCEDURE — 82565 ASSAY OF CREATININE: CPT

## 2025-08-29 PROCEDURE — 25510000001 GADOPICLENOL 0.5 MMOL/ML SOLUTION: Performed by: NURSE PRACTITIONER

## 2025-08-29 PROCEDURE — A9573 GADOPICLENOL 0.5 MMOL/ML SOLUTION: HCPCS | Performed by: NURSE PRACTITIONER

## 2025-08-29 PROCEDURE — 72197 MRI PELVIS W/O & W/DYE: CPT

## 2025-08-29 RX ADMIN — GADOPICLENOL 10 ML: 485.1 INJECTION INTRAVENOUS at 09:11

## (undated) DEVICE — PENCL E/S SMOKEEVAC W/TELESCP CANN

## (undated) DEVICE — GOWN,REINFORCE,POLY,SIRUS,BREATH SLV,XLG: Brand: MEDLINE

## (undated) DEVICE — DBD-PACK,EENT,SIRUS,PK II: Brand: MEDLINE

## (undated) DEVICE — GLV SURG SENSICARE SLT PF LF 5.5 STRL

## (undated) DEVICE — MINOR-LF: Brand: MEDLINE INDUSTRIES, INC.

## (undated) DEVICE — GLV SURG SENSICARE PI PF LF 7 GRN STRL

## (undated) DEVICE — CVR HNDL LT SURG ACCSSRY BLU STRL

## (undated) DEVICE — SLV SCD KN/LEN ADJ EXPRSS BLENDED MD 1P/U

## (undated) DEVICE — INTENDED FOR TISSUE SEPARATION, AND OTHER PROCEDURES THAT REQUIRE A SHARP SURGICAL BLADE TO PUNCTURE OR CUT.: Brand: BARD-PARKER ® CARBON RIB-BACK BLADES

## (undated) DEVICE — GLV SURG SENSICARE SLT PF LF 6.5 STRL